# Patient Record
Sex: MALE | Race: WHITE | NOT HISPANIC OR LATINO | Employment: FULL TIME | URBAN - METROPOLITAN AREA
[De-identification: names, ages, dates, MRNs, and addresses within clinical notes are randomized per-mention and may not be internally consistent; named-entity substitution may affect disease eponyms.]

---

## 2019-03-10 ENCOUNTER — OFFICE VISIT (OUTPATIENT)
Dept: URGENT CARE | Facility: PHYSICIAN GROUP | Age: 49
End: 2019-03-10
Payer: COMMERCIAL

## 2019-03-10 VITALS
HEIGHT: 73 IN | HEART RATE: 77 BPM | DIASTOLIC BLOOD PRESSURE: 104 MMHG | BODY MASS INDEX: 22.53 KG/M2 | WEIGHT: 170 LBS | TEMPERATURE: 98.8 F | OXYGEN SATURATION: 99 % | RESPIRATION RATE: 16 BRPM | SYSTOLIC BLOOD PRESSURE: 162 MMHG

## 2019-03-10 DIAGNOSIS — K04.7 DENTAL ABSCESS: ICD-10-CM

## 2019-03-10 PROCEDURE — 99204 OFFICE O/P NEW MOD 45 MIN: CPT | Performed by: FAMILY MEDICINE

## 2019-03-10 RX ORDER — AMOXICILLIN AND CLAVULANATE POTASSIUM 875; 125 MG/1; MG/1
1 TABLET, FILM COATED ORAL 2 TIMES DAILY
Qty: 14 TAB | Refills: 0 | Status: SHIPPED | OUTPATIENT
Start: 2019-03-10 | End: 2019-03-17

## 2019-03-10 NOTE — PROGRESS NOTES
"Chief Complaint   Patient presents with   • Dental Pain     R side facial [id number]      Subjective:      Chief Complaint   Patient presents with   • Dental Pain     R side facial [id number]                Dental Pain   This is a new problem. The current episode started in the past 3 days. The problem occurs constantly (constant, throbbing).   Pain is located at rt upper 2nd molar.    The problem has been worsening. Pertinent negatives include no chills, congestion, fatigue, fever, nausea, visual change or vomiting. Chewing aggravates the symptoms. Pt has tried tylenol for the symptoms - no improvement.             Past medical history was unremarkable and not pertinent to current issue  Social hx - denies tobacco, alcohol, drug use  Family hx was reviewed - no pertinent past family hx            Review of Systems   Constitutional: Negative for fever, chills and malaise/fatigue.   HEENT: Positive for facial swelling.   Eyes: Negative for vision changes, d/c.    Respiratory: Negative for cough and sputum production.    Cardiovascular: Negative for chest pain and palpitations.   Gastrointestinal: Negative for nausea, vomiting, abdominal pain, diarrhea and constipation.   Genitourinary: Negative for dysuria, urgency and frequency.   Skin: Negative for rash or  itching.   Neurological: Negative for dizziness and tingling.   Psychiatric/Behavioral: Negative for depression.   Hematologic/lymphatic - denies bruising or excessive bleeding  All other systems reviewed and are negative.            Objective:     Blood pressure (!) 162/104, pulse 77, temperature 37.1 °C (98.8 °F), temperature source Temporal, resp. rate 16, height 1.854 m (6' 1\"), weight 77.1 kg (170 lb), SpO2 99 %.    Physical Exam   Constitutional: pt is oriented to person, place, and time. Pt appears well-developed. No distress.   HENT:   Head: Normocephalic and atraumatic.   Mouth/Throat:     Gingival swelling and tenderness around 2nd   Upper    " Right  molar  Lymph:   + cervical LAD on the right  Eyes: Conjunctivae are normal. Pupils are equal, round, and reactive to light. No scleral icterus.   Cardiovascular: Normal rate and regular rhythm.    Pulmonary/Chest: Effort normal and breath sounds normal. No respiratory distress. Pt has no wheezes.   Neurological: pt is alert and oriented to person, place, and time. No cranial nerve deficit.   Skin: Skin is warm. He is not diaphoretic. No erythema.   Psychiatric:  behavior is normal.   Nursing note and vitals reviewed.              Assessment/Plan:     1. Dental abscess     - amoxicillin-clavulanate (AUGMENTIN) 875-125 MG Tab; Take 1 Tab by mouth 2 times a day for 7 days.  Dispense: 14 Tab; Refill: 0    F/u with dentist ASAP

## 2019-06-20 DIAGNOSIS — Z01.812 PRE-OPERATIVE LABORATORY EXAMINATION: ICD-10-CM

## 2019-06-20 LAB
ALBUMIN SERPL BCP-MCNC: 4.1 G/DL (ref 3.2–4.9)
ALBUMIN/GLOB SERPL: 1.9 G/DL
ALP SERPL-CCNC: 47 U/L (ref 30–99)
ALT SERPL-CCNC: 20 U/L (ref 2–50)
ANION GAP SERPL CALC-SCNC: 5 MMOL/L (ref 0–11.9)
APPEARANCE UR: CLEAR
AST SERPL-CCNC: 18 U/L (ref 12–45)
BILIRUB SERPL-MCNC: 1.4 MG/DL (ref 0.1–1.5)
BILIRUB UR QL STRIP.AUTO: NEGATIVE
BUN SERPL-MCNC: 16 MG/DL (ref 8–22)
CALCIUM SERPL-MCNC: 8.9 MG/DL (ref 8.5–10.5)
CHLORIDE SERPL-SCNC: 106 MMOL/L (ref 96–112)
CO2 SERPL-SCNC: 26 MMOL/L (ref 20–33)
COLOR UR: YELLOW
CREAT SERPL-MCNC: 1.21 MG/DL (ref 0.5–1.4)
ERYTHROCYTE [DISTWIDTH] IN BLOOD BY AUTOMATED COUNT: 41.2 FL (ref 35.9–50)
GLOBULIN SER CALC-MCNC: 2.2 G/DL (ref 1.9–3.5)
GLUCOSE SERPL-MCNC: 69 MG/DL (ref 65–99)
GLUCOSE UR STRIP.AUTO-MCNC: NEGATIVE MG/DL
HCT VFR BLD AUTO: 42.7 % (ref 42–52)
HGB BLD-MCNC: 13.8 G/DL (ref 14–18)
KETONES UR STRIP.AUTO-MCNC: NEGATIVE MG/DL
LEUKOCYTE ESTERASE UR QL STRIP.AUTO: NEGATIVE
MCH RBC QN AUTO: 29.1 PG (ref 27–33)
MCHC RBC AUTO-ENTMCNC: 32.3 G/DL (ref 33.7–35.3)
MCV RBC AUTO: 90.1 FL (ref 81.4–97.8)
MICRO URNS: NORMAL
NITRITE UR QL STRIP.AUTO: NEGATIVE
PH UR STRIP.AUTO: 6 [PH]
PLATELET # BLD AUTO: 216 K/UL (ref 164–446)
PMV BLD AUTO: 10.4 FL (ref 9–12.9)
POTASSIUM SERPL-SCNC: 4 MMOL/L (ref 3.6–5.5)
PROT SERPL-MCNC: 6.3 G/DL (ref 6–8.2)
PROT UR QL STRIP: NEGATIVE MG/DL
RBC # BLD AUTO: 4.74 M/UL (ref 4.7–6.1)
RBC UR QL AUTO: NEGATIVE
SODIUM SERPL-SCNC: 137 MMOL/L (ref 135–145)
SP GR UR STRIP.AUTO: 1.02
UROBILINOGEN UR STRIP.AUTO-MCNC: 0.2 MG/DL
WBC # BLD AUTO: 6.5 K/UL (ref 4.8–10.8)

## 2019-06-20 PROCEDURE — 87086 URINE CULTURE/COLONY COUNT: CPT

## 2019-06-20 PROCEDURE — 81003 URINALYSIS AUTO W/O SCOPE: CPT

## 2019-06-20 PROCEDURE — 80053 COMPREHEN METABOLIC PANEL: CPT

## 2019-06-20 PROCEDURE — 36415 COLL VENOUS BLD VENIPUNCTURE: CPT

## 2019-06-20 PROCEDURE — 85027 COMPLETE CBC AUTOMATED: CPT

## 2019-06-23 LAB
BACTERIA UR CULT: NORMAL
SIGNIFICANT IND 70042: NORMAL
SITE SITE: NORMAL
SOURCE SOURCE: NORMAL

## 2019-06-27 ENCOUNTER — HOSPITAL ENCOUNTER (OUTPATIENT)
Facility: MEDICAL CENTER | Age: 49
End: 2019-06-27
Attending: UROLOGY | Admitting: UROLOGY
Payer: COMMERCIAL

## 2019-06-27 ENCOUNTER — ANESTHESIA EVENT (OUTPATIENT)
Dept: SURGERY | Facility: MEDICAL CENTER | Age: 49
End: 2019-06-27
Payer: COMMERCIAL

## 2019-06-27 ENCOUNTER — ANESTHESIA (OUTPATIENT)
Dept: SURGERY | Facility: MEDICAL CENTER | Age: 49
End: 2019-06-27
Payer: COMMERCIAL

## 2019-06-27 VITALS
OXYGEN SATURATION: 98 % | WEIGHT: 154.1 LBS | TEMPERATURE: 97.5 F | BODY MASS INDEX: 20.42 KG/M2 | RESPIRATION RATE: 16 BRPM | HEIGHT: 73 IN

## 2019-06-27 DIAGNOSIS — G89.18 POSTOPERATIVE PAIN: ICD-10-CM

## 2019-06-27 LAB — PATHOLOGY CONSULT NOTE: NORMAL

## 2019-06-27 PROCEDURE — 501838 HCHG SUTURE GENERAL: Performed by: UROLOGY

## 2019-06-27 PROCEDURE — A6403 STERILE GAUZE>16 <= 48 SQ IN: HCPCS | Performed by: UROLOGY

## 2019-06-27 PROCEDURE — 160036 HCHG PACU - EA ADDL 30 MINS PHASE I: Performed by: UROLOGY

## 2019-06-27 PROCEDURE — 88305 TISSUE EXAM BY PATHOLOGIST: CPT

## 2019-06-27 PROCEDURE — 160048 HCHG OR STATISTICAL LEVEL 1-5: Performed by: UROLOGY

## 2019-06-27 PROCEDURE — 160046 HCHG PACU - 1ST 60 MINS PHASE II: Performed by: UROLOGY

## 2019-06-27 PROCEDURE — 160002 HCHG RECOVERY MINUTES (STAT): Performed by: UROLOGY

## 2019-06-27 PROCEDURE — 700105 HCHG RX REV CODE 258: Performed by: UROLOGY

## 2019-06-27 PROCEDURE — 501445 HCHG STAPLER, SKIN DISP: Performed by: UROLOGY

## 2019-06-27 PROCEDURE — A6402 STERILE GAUZE <= 16 SQ IN: HCPCS | Performed by: UROLOGY

## 2019-06-27 PROCEDURE — 500380 HCHG DRAIN, PENROSE 1/4X12: Performed by: UROLOGY

## 2019-06-27 PROCEDURE — 160041 HCHG SURGERY MINUTES - EA ADDL 1 MIN LEVEL 4: Performed by: UROLOGY

## 2019-06-27 PROCEDURE — 160009 HCHG ANES TIME/MIN: Performed by: UROLOGY

## 2019-06-27 PROCEDURE — 700101 HCHG RX REV CODE 250: Performed by: ANESTHESIOLOGY

## 2019-06-27 PROCEDURE — 700111 HCHG RX REV CODE 636 W/ 250 OVERRIDE (IP): Performed by: ANESTHESIOLOGY

## 2019-06-27 PROCEDURE — 700111 HCHG RX REV CODE 636 W/ 250 OVERRIDE (IP): Performed by: UROLOGY

## 2019-06-27 PROCEDURE — 700102 HCHG RX REV CODE 250 W/ 637 OVERRIDE(OP): Performed by: ANESTHESIOLOGY

## 2019-06-27 PROCEDURE — 160029 HCHG SURGERY MINUTES - 1ST 30 MINS LEVEL 4: Performed by: UROLOGY

## 2019-06-27 PROCEDURE — A9270 NON-COVERED ITEM OR SERVICE: HCPCS

## 2019-06-27 PROCEDURE — 160025 RECOVERY II MINUTES (STATS): Performed by: UROLOGY

## 2019-06-27 PROCEDURE — A9270 NON-COVERED ITEM OR SERVICE: HCPCS | Performed by: ANESTHESIOLOGY

## 2019-06-27 PROCEDURE — 160035 HCHG PACU - 1ST 60 MINS PHASE I: Performed by: UROLOGY

## 2019-06-27 PROCEDURE — 700102 HCHG RX REV CODE 250 W/ 637 OVERRIDE(OP)

## 2019-06-27 PROCEDURE — 700101 HCHG RX REV CODE 250: Performed by: UROLOGY

## 2019-06-27 RX ORDER — LABETALOL HYDROCHLORIDE 5 MG/ML
5 INJECTION, SOLUTION INTRAVENOUS
Status: DISCONTINUED | OUTPATIENT
Start: 2019-06-27 | End: 2019-06-27 | Stop reason: HOSPADM

## 2019-06-27 RX ORDER — METOCLOPRAMIDE HYDROCHLORIDE 5 MG/ML
INJECTION INTRAMUSCULAR; INTRAVENOUS
Status: COMPLETED
Start: 2019-06-27 | End: 2019-06-27

## 2019-06-27 RX ORDER — DEXAMETHASONE SODIUM PHOSPHATE 4 MG/ML
INJECTION, SOLUTION INTRA-ARTICULAR; INTRALESIONAL; INTRAMUSCULAR; INTRAVENOUS; SOFT TISSUE PRN
Status: DISCONTINUED | OUTPATIENT
Start: 2019-06-27 | End: 2019-06-27 | Stop reason: SURG

## 2019-06-27 RX ORDER — DIPHENHYDRAMINE HYDROCHLORIDE 50 MG/ML
12.5 INJECTION INTRAMUSCULAR; INTRAVENOUS
Status: DISCONTINUED | OUTPATIENT
Start: 2019-06-27 | End: 2019-06-27 | Stop reason: HOSPADM

## 2019-06-27 RX ORDER — ONDANSETRON 2 MG/ML
INJECTION INTRAMUSCULAR; INTRAVENOUS PRN
Status: DISCONTINUED | OUTPATIENT
Start: 2019-06-27 | End: 2019-06-27 | Stop reason: SURG

## 2019-06-27 RX ORDER — OXYCODONE HCL 5 MG/5 ML
5 SOLUTION, ORAL ORAL
Status: COMPLETED | OUTPATIENT
Start: 2019-06-27 | End: 2019-06-27

## 2019-06-27 RX ORDER — BUPIVACAINE HYDROCHLORIDE 2.5 MG/ML
INJECTION, SOLUTION EPIDURAL; INFILTRATION; INTRACAUDAL
Status: DISCONTINUED | OUTPATIENT
Start: 2019-06-27 | End: 2019-06-27 | Stop reason: HOSPADM

## 2019-06-27 RX ORDER — OXYCODONE HCL 5 MG/5 ML
10 SOLUTION, ORAL ORAL
Status: COMPLETED | OUTPATIENT
Start: 2019-06-27 | End: 2019-06-27

## 2019-06-27 RX ORDER — MEPERIDINE HYDROCHLORIDE 25 MG/ML
12.5 INJECTION INTRAMUSCULAR; INTRAVENOUS; SUBCUTANEOUS
Status: DISCONTINUED | OUTPATIENT
Start: 2019-06-27 | End: 2019-06-27 | Stop reason: HOSPADM

## 2019-06-27 RX ORDER — SODIUM CHLORIDE, SODIUM LACTATE, POTASSIUM CHLORIDE, CALCIUM CHLORIDE 600; 310; 30; 20 MG/100ML; MG/100ML; MG/100ML; MG/100ML
INJECTION, SOLUTION INTRAVENOUS CONTINUOUS
Status: DISCONTINUED | OUTPATIENT
Start: 2019-06-27 | End: 2019-06-27 | Stop reason: HOSPADM

## 2019-06-27 RX ORDER — HYDROMORPHONE HYDROCHLORIDE 1 MG/ML
0.1 INJECTION, SOLUTION INTRAMUSCULAR; INTRAVENOUS; SUBCUTANEOUS
Status: DISCONTINUED | OUTPATIENT
Start: 2019-06-27 | End: 2019-06-27 | Stop reason: HOSPADM

## 2019-06-27 RX ORDER — HYDROMORPHONE HYDROCHLORIDE 1 MG/ML
0.2 INJECTION, SOLUTION INTRAMUSCULAR; INTRAVENOUS; SUBCUTANEOUS
Status: DISCONTINUED | OUTPATIENT
Start: 2019-06-27 | End: 2019-06-27 | Stop reason: HOSPADM

## 2019-06-27 RX ORDER — SODIUM CHLORIDE, SODIUM LACTATE, POTASSIUM CHLORIDE, AND CALCIUM CHLORIDE .6; .31; .03; .02 G/100ML; G/100ML; G/100ML; G/100ML
500 INJECTION, SOLUTION INTRAVENOUS ONCE
Status: DISCONTINUED | OUTPATIENT
Start: 2019-06-27 | End: 2019-06-27 | Stop reason: HOSPADM

## 2019-06-27 RX ORDER — MIDAZOLAM HYDROCHLORIDE 1 MG/ML
INJECTION INTRAMUSCULAR; INTRAVENOUS
Status: COMPLETED
Start: 2019-06-27 | End: 2019-06-27

## 2019-06-27 RX ORDER — CEFAZOLIN SODIUM 1 G/3ML
INJECTION, POWDER, FOR SOLUTION INTRAMUSCULAR; INTRAVENOUS PRN
Status: DISCONTINUED | OUTPATIENT
Start: 2019-06-27 | End: 2019-06-27 | Stop reason: SURG

## 2019-06-27 RX ORDER — ACETAMINOPHEN 500 MG
TABLET ORAL
Status: COMPLETED
Start: 2019-06-27 | End: 2019-06-27

## 2019-06-27 RX ORDER — COLLODION, FLEXIBLE
LIQUID (ML) MISCELLANEOUS
Status: DISCONTINUED | OUTPATIENT
Start: 2019-06-27 | End: 2019-06-27 | Stop reason: HOSPADM

## 2019-06-27 RX ORDER — METOCLOPRAMIDE HYDROCHLORIDE 5 MG/ML
INJECTION INTRAMUSCULAR; INTRAVENOUS PRN
Status: DISCONTINUED | OUTPATIENT
Start: 2019-06-27 | End: 2019-06-27 | Stop reason: SURG

## 2019-06-27 RX ORDER — ACETAMINOPHEN 500 MG
1000 TABLET ORAL ONCE
Status: DISCONTINUED | OUTPATIENT
Start: 2019-06-27 | End: 2019-06-27 | Stop reason: HOSPADM

## 2019-06-27 RX ORDER — ONDANSETRON 2 MG/ML
4 INJECTION INTRAMUSCULAR; INTRAVENOUS
Status: DISCONTINUED | OUTPATIENT
Start: 2019-06-27 | End: 2019-06-27 | Stop reason: HOSPADM

## 2019-06-27 RX ORDER — HYDROCODONE BITARTRATE AND ACETAMINOPHEN 5; 325 MG/1; MG/1
1 TABLET ORAL EVERY 4 HOURS PRN
Qty: 20 TAB | Refills: 0 | Status: SHIPPED | OUTPATIENT
Start: 2019-06-27 | End: 2019-07-04

## 2019-06-27 RX ORDER — HYDROMORPHONE HYDROCHLORIDE 1 MG/ML
0.4 INJECTION, SOLUTION INTRAMUSCULAR; INTRAVENOUS; SUBCUTANEOUS
Status: DISCONTINUED | OUTPATIENT
Start: 2019-06-27 | End: 2019-06-27 | Stop reason: HOSPADM

## 2019-06-27 RX ORDER — CELECOXIB 200 MG/1
CAPSULE ORAL
Status: COMPLETED
Start: 2019-06-27 | End: 2019-06-27

## 2019-06-27 RX ORDER — CELECOXIB 200 MG/1
200 CAPSULE ORAL ONCE
Status: DISCONTINUED | OUTPATIENT
Start: 2019-06-27 | End: 2019-06-27 | Stop reason: HOSPADM

## 2019-06-27 RX ORDER — HYDRALAZINE HYDROCHLORIDE 20 MG/ML
5 INJECTION INTRAMUSCULAR; INTRAVENOUS
Status: DISCONTINUED | OUTPATIENT
Start: 2019-06-27 | End: 2019-06-27 | Stop reason: HOSPADM

## 2019-06-27 RX ADMIN — MIDAZOLAM HYDROCHLORIDE 2 MG: 1 INJECTION, SOLUTION INTRAMUSCULAR; INTRAVENOUS at 07:32

## 2019-06-27 RX ADMIN — FENTANYL CITRATE 100 MCG: 50 INJECTION, SOLUTION INTRAMUSCULAR; INTRAVENOUS at 07:37

## 2019-06-27 RX ADMIN — LIDOCAINE HYDROCHLORIDE 0.5 ML: 10 INJECTION, SOLUTION INFILTRATION; PERINEURAL at 06:24

## 2019-06-27 RX ADMIN — ONDANSETRON 4 MG: 2 INJECTION INTRAMUSCULAR; INTRAVENOUS at 07:50

## 2019-06-27 RX ADMIN — LIDOCAINE HYDROCHLORIDE 100 MG: 20 INJECTION, SOLUTION INFILTRATION; PERINEURAL at 07:37

## 2019-06-27 RX ADMIN — ACETAMINOPHEN 1000 MG: 500 TABLET, FILM COATED ORAL at 07:12

## 2019-06-27 RX ADMIN — CELECOXIB 200 MG: 200 CAPSULE ORAL at 07:12

## 2019-06-27 RX ADMIN — CEFAZOLIN 2 G: 1 INJECTION, POWDER, FOR SOLUTION INTRAVENOUS at 07:32

## 2019-06-27 RX ADMIN — OXYCODONE HYDROCHLORIDE 5 MG: 5 SOLUTION ORAL at 11:41

## 2019-06-27 RX ADMIN — SODIUM CHLORIDE, POTASSIUM CHLORIDE, SODIUM LACTATE AND CALCIUM CHLORIDE: 600; 310; 30; 20 INJECTION, SOLUTION INTRAVENOUS at 09:00

## 2019-06-27 RX ADMIN — METOCLOPRAMIDE 10 MG: 5 INJECTION, SOLUTION INTRAMUSCULAR; INTRAVENOUS at 07:32

## 2019-06-27 RX ADMIN — SODIUM CHLORIDE, POTASSIUM CHLORIDE, SODIUM LACTATE AND CALCIUM CHLORIDE: 600; 310; 30; 20 INJECTION, SOLUTION INTRAVENOUS at 09:20

## 2019-06-27 RX ADMIN — SODIUM CHLORIDE, POTASSIUM CHLORIDE, SODIUM LACTATE AND CALCIUM CHLORIDE: 600; 310; 30; 20 INJECTION, SOLUTION INTRAVENOUS at 06:24

## 2019-06-27 RX ADMIN — SODIUM CHLORIDE, POTASSIUM CHLORIDE, SODIUM LACTATE AND CALCIUM CHLORIDE: 600; 310; 30; 20 INJECTION, SOLUTION INTRAVENOUS at 07:32

## 2019-06-27 RX ADMIN — FENTANYL CITRATE 100 MCG: 50 INJECTION, SOLUTION INTRAMUSCULAR; INTRAVENOUS at 08:47

## 2019-06-27 RX ADMIN — DEXAMETHASONE SODIUM PHOSPHATE 10 MG: 4 INJECTION, SOLUTION INTRAMUSCULAR; INTRAVENOUS at 07:50

## 2019-06-27 RX ADMIN — PROPOFOL 200 MG: 10 INJECTION, EMULSION INTRAVENOUS at 07:37

## 2019-06-27 ASSESSMENT — PAIN SCALES - GENERAL: PAIN_LEVEL: 1

## 2019-06-27 NOTE — OR NURSING
1031: Pt arrived post R epididymectomy, OPA in use/respirations unlabored/sats approp with NC, Anesthesia report/OR RN hand off, IVF infusing, Dressing to R groin-steri strips/benzoin/tegaderm and fluff/scrotal support in place-scrotal incision covered with liquid adhesive, Ice pack placed to R groin area  1038: No changes  1054: OPA removed, NC oxygen

## 2019-06-27 NOTE — OP REPORT
DATE OF SERVICE:  06/27/2019    PREOPERATIVE DIAGNOSES:  History of right scrotal trauma with retraction of   testicle and chronic right testicular pain, chronic right epididymitis.    POSTOPERATIVE DIAGNOSES:  History of right scrotal trauma with retraction of   testicle and chronic right testicular pain, chronic right epididymitis, severe   scarring of right distal inguinal canal.    PROCEDURE:  Right inguinal canal exploration with lysis of marked scarring   along right cord with right epididymectomy and right orchiopexy.    ANESTHESIA:  General.    ANESTHESIOLOGIST:  Shakeel Pena MD    SURGEON:  Drake Gaines MD    BRIEF HISTORY:  This 49-year-old male who suffered testicular trauma several   years ago.  After that, he had fairly severe hematoma, but subsequent to that,   his right testicle then retracted up into the lower inguinal canal and he has   had chronic testicular discomfort ever since.  Also ultrasound did show what   was suspected to be enlarged epididymis, but a normal testicle.  Also, at this   point on exam, the testicle was palpable right at the external ring and is   not movable.  At this point, he now presents for exploration of the inguinal   canal and scrotum with lysis of adhesions and an attempt at a right   epididymectomy and probably orchiopexy.    REPORT OF OPERATION:  Under general anesthesia with the patient supine on the   operating room table, the scrotum and lower abdomen were shaved, prepped and   draped in a sterile manner.  A right inguinal incision was made and dissection   carried down to the external oblique aponeurosis, which was opened up along   its longitudinal fibers.  Care was taken to protect the ilioinguinal nerve,   which was then dissected off the cord.  This was then passed out of the field   under a hemostat on fascial edge.  Next, I was able to then push the testicle   up into the field, but it was quite fixed.  There also did appear to be a mass    in the cord probably it did appear to be a lipoma of the cord.  At this   point, I then worked to free the testicle and cord up circumferentially.  The   testicle did seem to be fairly fixed into the scrotal tissue.  I was able to   gradually dissect this off using both sharp, blunt and electrocautery   dissection.  This did take some time.  Once the testicle was then freed up, I   then was able to dissect posterior and lateral to the testicle.  Care was   taken to really avoid damaging any of the vital structures in the cord.  Once   I was able to free the testicle up, I was able to free it up to close to the   internal ring, but there was really marked hard scarring on the distal cord on   the area of the testicle.  I was not able to really identify the tunica   vaginalis because of severe thickening of this area and scarring.  At this   point what I thought was the epididymis, I then gently dissected down through   this scar tissue.  Eventually, I did find a plane and was able to dissect onto   the testicle.  This did open up the tunica vaginalis which was able to be   opened up along its longitudinal fibers.  No abnormalities of the testicle   were noted.  I then found the epididymis which was fairly normal in   appearance.  At this point, I was able to identify the structures, but really   on the distal cord, it was severely scarred.  I could feel the vas deferens up   above the scarring.  The scarring did extend for approximately 2 cm down on   to the testicle.  At this point, I began to dissect the epididymis off of the   testicle staying close to the epididymis.  Points of bleeding were cauterized   with the electrocautery.  I was able to then dissect up to approximately the   tail of the epididymis, but this did require quite a bit of gentle teasing and   dissection to free this from the overlying scar tissue.  At this point, when   I felt that that the tail of the epididymis going into the vas, I elected  to   just excise the epididymis at this point.  I was afraid further dissection of   the vas through the scar would lead to possible injury to the blood supply to   the testicle.  The testicle did appear viable throughout the whole case   though. At this point, I was able to free up several other adhesions of the   cord near the internal ring.  I was then able to get the testicle down into   probably a mid scrotal position.  At this point, I then created a subdartos   pouch.  At this point, the underlying fascia was incised transversely.  A   stitch of 3-0 Vicryl was then placed at each corner and then I was able to   bring the testicle down through the tract into this pouch.  It was then   anchored bilaterally.  The testicle was then placed under the skin in the   scrotum and I put a third stitch anchor in the inferior aspect of the testicle   to the subcutaneous tissue.  At this point, 3-4 mL of 0.25% plain Marcaine   was then instilled into the scrotal skin.  The skin was then closed with   interrupted stitch of 2-0 Vicryl.  At this point, Collodion was applied to the   incision later in the case.  Next, about 5 mL were instilled into the cord   itself.  The skin was anesthetized at the beginning of the case and then at   this point also.  The external oblique aponeurosis was then reapproximated   with a running stitch of 2-0 Vicryl with care taken to preserve the   ilioinguinal nerve.  The subcutaneous tissue was reapproximated with a running   stitch of 2-0 Vicryl.  Skin was closed with a running subcuticular stitch of   2-0 Monocryl.  Once this was done, more Marcaine was then instilled into the   incision.  Benzoin, Steri-Strips, and Tegaderm were applied to this inguinal   incision.  The testicle was in the mid portion of the scrotum.  At this point,   the patient was totally cleaned up.  Fluffs and a scrotal support were placed   on the patient.  He was then woken up, and transferred to the PACU in stable    condition.     TIME OF OPERATION:  Approximately 2-1/2 hours.       ____________________________________     MD PHYLLIS CAO / JOSE A    DD:  06/27/2019 10:43:35  DT:  06/27/2019 12:33:14    D#:  [id number]  Job#:  [id number]

## 2019-06-27 NOTE — OR NURSING
1112: Pt groggy but awakens, Small sips of water, Weaning oxygen as tolerated  1140: Pt starting to have some p[ain-rates very low/see MAR, Denies nausea, RA paulo approp, Dressing to R groin intact-scant drainage noted, Pt will transfer soon to [facility]

## 2019-06-27 NOTE — DISCHARGE INSTRUCTIONS
ACTIVITY: Rest and take it easy for the first 24 hours.  A responsible adult is recommended to remain with you during that time.  It is normal to feel sleepy.  We encourage you to not do anything that requires balance, judgment or coordination.    MILD FLU-LIKE SYMPTOMS ARE NORMAL. YOU MAY EXPERIENCE GENERALIZED MUSCLE ACHES, THROAT IRRITATION, HEADACHE AND/OR SOME NAUSEA.    FOR 24 HOURS DO NOT:  Drive, operate machinery or run household appliances.  Drink beer or alcoholic beverages.   Make important decisions or sign legal documents.    SPECIAL INSTRUCTIONS:   Keep dressing clean, dry and intact; may shower after 24 hours. Wear scrotal support day and night x 1 week    DIET: To avoid nausea, slowly advance diet as tolerated, avoiding spicy or greasy foods for the first day.  Add more substantial food to your diet according to your physician's instructions.  INCREASE FLUIDS AND FIBER TO AVOID CONSTIPATION.    FOLLOW-UP APPOINTMENT:  A follow-up appointment should be arranged with your doctor; call to schedule.    You should CALL YOUR PHYSICIAN if you develop:  Fever greater than 101 degrees F.  Pain not relieved by medication, or persistent nausea or vomiting.  Excessive bleeding (blood soaking through dressing) or unexpected drainage from the wound.  Extreme redness or swelling around the incision site, drainage of pus or foul smelling drainage.  Inability to urinate or empty your bladder within 8 hours.  Problems with breathing or chest pain.    You should call 911 if you develop problems with breathing or chest pain.  If you are unable to contact your doctor or surgical center, you should go to the nearest emergency room or urgent care center.  Dr Gaines's telephone #: [phone]    If any questions arise, call your doctor.  If your doctor is not available, please feel free to call the Surgical Center at [phone].  The Center is open Monday through Friday from 7AM to 7PM.  You can also call the  HEALTH HOTLINE open 24 hours/day, 7 days/week and speak to a nurse at [phone], or toll free at [phone].    A registered nurse may call you a few days after your surgery to see how you are doing after your procedure.    MEDICATIONS: Resume taking daily medication.  Take prescribed pain medication with food.  If no medication is prescribed, you may take non-aspirin pain medication if needed.  PAIN MEDICATION CAN BE VERY CONSTIPATING.  Take a stool softener or laxative such as senokot, pericolace, or milk of magnesia if needed.    Prescription given for Norco.  Last pain medication given at               .    If your physician has prescribed pain medication that includes Acetaminophen (Tylenol), do not take additional Acetaminophen (Tylenol) while taking the prescribed medication.    Depression / Suicide Risk    As you are discharged from this [facility] facility, it is important to learn how to keep safe from harming yourself.    Recognize the warning signs:  · Abrupt changes in personality, positive or negative- including increase in energy   · Giving away possessions  · Change in eating patterns- significant weight changes-  positive or negative  · Change in sleeping patterns- unable to sleep or sleeping all the time   · Unwillingness or inability to communicate  · Depression  · Unusual sadness, discouragement and loneliness  · Talk of wanting to die  · Neglect of personal appearance   · Rebelliousness- reckless behavior  · Withdrawal from people/activities they love  · Confusion- inability to concentrate     If you or a loved one observes any of these behaviors or has concerns about self-harm, here's what you can do:  · Talk about it- your feelings and reasons for harming yourself  · Remove any means that you might use to hurt yourself (examples: pills, rope, extension cords, firearm)  · Get professional help from the community (Mental Health, Substance Abuse, psychological counseling)  · Do not be  alone:Call your Safe Contact- someone whom you trust who will be there for you.  · Call your local CRISIS HOTLINE 680-0901 or 225-551-5137  · Call your local Children's Mobile Crisis Response Team [place] (013) 132-5042 or www.Hangtime  · Call the toll free National Suicide Prevention Hotlines   · National Suicide Prevention Lifeline 985-477-RCJS (4989)  · [facility] Network 800-SUICIDE (635-0176)

## 2019-06-27 NOTE — OR SURGEON
Immediate Post OP Note    PreOp Diagnosis: History of right scrotal trauma; chronic right testicular pain; Entrapment of testicle in right; chroni right epididymitis inguinal canal    PostOp Diagnosis: Same;  Marked scarring of right cord with     Procedure(s):  EPIDIDYMECTOMY - VIA INGUINAL INCISION - Wound Class: Clean    Surgeon(s):  Drake Gaines M.D.    Anesthesiologist/Type of Anesthesia:  Anesthesiologist: Shakeel Pena M.D./General    Surgical Staff:  Circulator: Muna Arana R.N.  Relief Circulator: Austen Wong R.N.  Relief Scrub: Cornel Nguyễn R.N.  Scrub Person: True Craft    Specimens removed if any:  ID Type Source Tests Collected by Time Destination   A : right epididymis Tissue Testis PATHOLOGY SPECIMEN Drake Gaines M.D. 6/27/2019  9:45 AM        Estimated Blood Loss: 60 cc    Findings:  As above    Complications:  none        6/27/2019 10:32 AM Drake Gaines M.D.

## 2019-06-27 NOTE — ANESTHESIA TIME REPORT
Anesthesia Start and Stop Event Times     Date Time Event    6/27/2019 0732 Anesthesia Start     1033 Anesthesia Stop        Responsible Staff  06/27/19    Name Role Begin End    Shakeel Pena M.D. 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

## 2019-06-27 NOTE — ANESTHESIA PROCEDURE NOTES
Airway  Date/Time: 6/27/2019 7:38 AM  Performed by: LOW CHAPARRO  Authorized by: LOW CHAPARRO     Location:  OR  Urgency:  Elective  Indications for Airway Management:  Anesthesia  Spontaneous Ventilation: absent    Sedation Level:  Deep  Preoxygenated: Yes    Patient Position:  Sniffing  Mask Difficulty Assessment:  1 - vent by mask  Final Airway Type:  Supraglottic airway  Final Supraglottic Airway:  Standard LMA  SGA Size:  5  Number of Attempts at Approach:  1

## 2019-06-27 NOTE — ANESTHESIA POSTPROCEDURE EVALUATION
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 Responsible Provider:  Shakeel Pena M.D.    Anesthesia Type:  general ASA Status:  1          Final Anesthesia Type: general  Last vitals  BP   NIBP: 148/85    Temp   37.1 °C (98.8 °F)    Pulse   Heart Rate (Monitored): 86   Resp   16    SpO2   100 %      Anesthesia Post Evaluation    Patient location during evaluation: PACU  Patient participation: complete - patient participated  Level of consciousness: sleepy but conscious  Pain score: 1    Airway patency: patent  Anesthetic complications: no  Cardiovascular status: hemodynamically stable  Respiratory status: acceptable  Hydration status: euvolemic    PONV: none           Nurse Pain Score: 4 (NPRS)

## 2019-09-03 ENCOUNTER — OFFICE VISIT (OUTPATIENT)
Dept: URGENT CARE | Facility: PHYSICIAN GROUP | Age: 49
End: 2019-09-03
Payer: COMMERCIAL

## 2019-09-03 VITALS
TEMPERATURE: 98.9 F | BODY MASS INDEX: 22.53 KG/M2 | HEART RATE: 90 BPM | SYSTOLIC BLOOD PRESSURE: 142 MMHG | DIASTOLIC BLOOD PRESSURE: 108 MMHG | WEIGHT: 170 LBS | HEIGHT: 73 IN | OXYGEN SATURATION: 99 %

## 2019-09-03 DIAGNOSIS — R22.31 MASS OF RIGHT AXILLA: ICD-10-CM

## 2019-09-03 PROCEDURE — 99213 OFFICE O/P EST LOW 20 MIN: CPT | Performed by: FAMILY MEDICINE

## 2019-09-04 NOTE — PROGRESS NOTES
"Subjective:      Chief Complaint   Patient presents with   • Nodule                     nodule   He noticed a small, tender nodule in rt armpit yesterday.  Pain is constant, mild, \"soreness\", that is tender to touch.   There is no rash or redness.           The problem is unchanged.     Pt was exposed to nothing. Pertinent negatives include no cough, fatigue, fever, shortness of breath or sore throat. Past treatments include nothing.     Past Medical History:   Diagnosis Date   • Dental disorder 06/2019    Partial uppers   • Pain 06/2019    Right testicle         Social History     Tobacco Use   • Smoking status: Never Smoker   • Smokeless tobacco: Never Used   Substance Use Topics   • Alcohol use: Yes     Comment: 3 per week   • Drug use: Yes     Comment: marijuana, inhales twice a day             Review of Systems   Constitutional: Negative for fever and fatigue.   HENT: Negative for sore throat.    Respiratory: Negative for cough and shortness of breath.    Skin: Positive for nodule.   All other systems reviewed and are negative.         Objective:   /108   Pulse 90   Temp 37.2 °C (98.9 °F) (Temporal)   Ht 1.854 m (6' 1\")   Wt 77.1 kg (170 lb)   SpO2 99%       Physical Exam   Constitutional: pt is oriented to person, place, and time. Pt appears well-developed and well-nourished. No distress.   HENT:   Head: Normocephalic and atraumatic.   Mouth/Throat: Oropharynx is clear and moist and mucous membranes are normal. No uvula swelling. No oropharyngeal exudate, posterior oropharyngeal edema or posterior oropharyngeal erythema.   Eyes: Conjunctivae are normal.   Cardiovascular: Normal rate, regular rhythm and normal heart sounds.    Pulmonary/Chest: Effort normal and breath sounds normal. No respiratory distress. She has no wheezes.   Neurological: pt is alert and oriented to person, place, and time. No cranial nerve deficit.   Skin: 3cm soft mobile mass in rt axilla.   No redness or skin changes. "     Lymph - there is no supraclavicular or cervical LAD    Pt is not diaphoretic.    Psychiatric: pt's behavior is normal.   Nursing note and vitals reviewed.              Assessment/Plan:           1. Mass of right axilla   likely reactive lymph node.  No obvious infection at this time.     U/s ordered for further characterization.

## 2020-07-12 ENCOUNTER — OFFICE VISIT (OUTPATIENT)
Dept: URGENT CARE | Facility: PHYSICIAN GROUP | Age: 50
End: 2020-07-12
Payer: COMMERCIAL

## 2020-07-12 VITALS
HEART RATE: 84 BPM | TEMPERATURE: 97.5 F | HEIGHT: 73 IN | RESPIRATION RATE: 16 BRPM | SYSTOLIC BLOOD PRESSURE: 128 MMHG | BODY MASS INDEX: 22.53 KG/M2 | WEIGHT: 170 LBS | OXYGEN SATURATION: 99 % | DIASTOLIC BLOOD PRESSURE: 80 MMHG

## 2020-07-12 DIAGNOSIS — T15.02XA CORNEAL FOREIGN BODY, LEFT, INITIAL ENCOUNTER: ICD-10-CM

## 2020-07-12 PROCEDURE — 99214 OFFICE O/P EST MOD 30 MIN: CPT | Performed by: NURSE PRACTITIONER

## 2020-07-12 RX ORDER — TOBRAMYCIN AND DEXAMETHASONE 3; 1 MG/ML; MG/ML
1 SUSPENSION/ DROPS OPHTHALMIC
Qty: 10 ML | Refills: 0 | Status: SHIPPED | OUTPATIENT
Start: 2020-07-12

## 2020-07-12 ASSESSMENT — ENCOUNTER SYMPTOMS
EYE PAIN: 1
BLURRED VISION: 0
CHILLS: 0
HEADACHES: 0
EYE REDNESS: 1
FEVER: 0
DOUBLE VISION: 0
PHOTOPHOBIA: 1
EYE DISCHARGE: 1
DIZZINESS: 0

## 2020-07-12 ASSESSMENT — VISUAL ACUITY: OU: 1

## 2020-07-12 ASSESSMENT — FIBROSIS 4 INDEX: FIB4 SCORE: 0.89

## 2020-07-12 NOTE — PROGRESS NOTES
Subjective:   Neville Peace  is a 50 y.o. male who presents for Foreign Body in Eye (last night was itchy and felt like something got in there )        50-year-old male patient reports urgent care for new problem that started last night. Patient states he was driving and felt pain in his left eye.  Patient denies any mechanism of injury or moment where he felt a foreign body but felt all of a sudden like there was something in his eye.  Patient states that he has massive tearing but no changes in vision.  Patient states that he is having hard time with bright lights and sunlight.  Denies any dizziness, nausea, vomiting or headache.  Patient has not taken anything over-the-counter for symptoms.    Review of Systems   Constitutional: Negative for chills, fever and malaise/fatigue.   Eyes: Positive for photophobia, pain, discharge and redness. Negative for blurred vision and double vision.   Neurological: Negative for dizziness and headaches.     Past Medical History:   Diagnosis Date   • Dental disorder 06/2019    Partial uppers   • Pain 06/2019    Right testicle      Past Surgical History:   Procedure Laterality Date   • PB EXPLORE SCROTUM N/A 6/27/2019    Procedure: EXPLORATION, SCROTUM;  Surgeon: Drake Gaines M.D.;  Location: [facility];  Service: Urology   • EPIDIDYMECTOMY Right 6/27/2019    Procedure: EPIDIDYMECTOMY - VIA INGUINAL INCISION;  Surgeon: Drake Gaines M.D.;  Location: [facility];  Service: Urology   • ORCHIOPEXY ADULT N/A 6/27/2019    Procedure: ORCHIOPEXY, ADULT;  Surgeon: Drake Gaines M.D.;  Location: [facility];  Service: Urology   • LYSIS ADHESIONS GENERAL N/A 6/27/2019    Procedure: LYSIS, ADHESIONS;  Surgeon: Drake Gaines M.D.;  Location: [facility];  Service: Urology   • DENTAL SURGERY  1996    [place] teeth       Social History     Socioeconomic History   • Marital status:       "Spouse name: Not on file   • Number of children: Not on file   • Years of education: Not on file   • Highest education level: Not on file   Occupational History   • Not on file   Social Needs   • Financial resource strain: Not on file   • Food insecurity     Worry: Not on file     Inability: Not on file   • Transportation needs     Medical: Not on file     Non-medical: Not on file   Tobacco Use   • Smoking status: Never Smoker   • Smokeless tobacco: Never Used   Substance and Sexual Activity   • Alcohol use: Yes     Comment: 3 per week   • Drug use: Yes     Comment: marijuana, inhales twice a day   • Sexual activity: Not on file   Lifestyle   • Physical activity     Days per week: Not on file     Minutes per session: Not on file   • Stress: Not on file   Relationships   • Social connections     Talks on phone: Not on file     Gets together: Not on file     Attends [employer] service: Not on file     Active member of club or organization: Not on file     Attends meetings of clubs or organizations: Not on file     Relationship status: Not on file   • Intimate partner violence     Fear of current or ex partner: Not on file     Emotionally abused: Not on file     Physically abused: Not on file     Forced sexual activity: Not on file   Other Topics Concern   • Not on file   Social History Narrative   • Not on file    Patient has no known allergies.       Objective:   /80 (BP Location: Right arm, Patient Position: Sitting, BP Cuff Size: Adult)   Pulse 84   Temp 36.4 °C (97.5 °F) (Tympanic)   Resp 16   Ht 1.854 m (6' 1\")   Wt 77.1 kg (170 lb)   SpO2 99%   BMI 22.43 kg/m²   Physical Exam  Vitals signs reviewed.   Constitutional:       Appearance: Normal appearance.   Eyes:      General: Lids are normal. Lids are everted, no foreign bodies appreciated. Vision grossly intact. Gaze aligned appropriately.      Extraocular Movements: Extraocular movements intact.      Conjunctiva/sclera:      Left eye: Left " conjunctiva is injected.      Pupils: Pupils are equal, round, and reactive to light.      Visual Fields: Right eye visual fields normal and left eye visual fields normal.      Comments: OD - 20/40  OU - 20/40  OS - 20/40    Fluorescein eye examination revealed foreign body at the 9 o'clock position just lateral to patient's pupil.  Unable to be moved with moistened Q-tip.   Cardiovascular:      Rate and Rhythm: Normal rate and regular rhythm.      Heart sounds: Normal heart sounds.   Pulmonary:      Effort: Pulmonary effort is normal.      Breath sounds: Normal breath sounds.   Skin:     General: Skin is warm.   Neurological:      Mental Status: He is alert and oriented to person, place, and time.   Psychiatric:         Mood and Affect: Mood normal.         Behavior: Behavior normal.         Thought Content: Thought content normal.         Judgment: Judgment normal.           Assessment/Plan:      1. Corneal foreign body, left, initial encounter  - tobramycin-dexamethasone (TOBRADEX) 0.3-0.1 % Suspension; Place 1 Drop in both eyes every 4 hours while awake.  Dispense: 10 mL; Refill: 0    Discussed physical examination findings are concerning for a foreign body and possible corneal ulceration or abrasion.  Due to the inability to remove the foreign body, provided patient with contact information for [facility] with a contact number.  Advised patient to call and make an appointment tomorrow.  Additionally prescribe patient TobraDex to use 1 drop every 4 hours and provided strict ER precautions for worsening of symptoms including pain and changes in vision.    Supportive care, differential diagnoses, and indications for immediate follow-up discussed with patient.    Pathogenesis of diagnosis discussed including typical length and natural progression. Patient expresses understanding and agrees to plan.    Instructed patient to return to clinic for worsening symptoms or symptoms that persist for 7 to 10  days     Please note that this dictation was created using voice recognition software. I have made every reasonable attempt to correct obvious errors, but I expect that there are errors of grammar and possibly content that I did not discover before finalizing the note.

## (undated) DEVICE — SPONGE PEANUT - (5/PK 50PK/CA)

## (undated) DEVICE — DRAPE LAPAROTOMY T SHEET - (12EA/CA)

## (undated) DEVICE — MASK ANESTHESIA ADULT  - (100/CA)

## (undated) DEVICE — SUTURE 3-0 VICRYL PLUS SH - 27 INCH (36/BX)

## (undated) DEVICE — DRAIN PENROSE STERILE 1/4 X - 18 IN  (25EA/BX)

## (undated) DEVICE — BLADE SURGICAL #15 - (50/BX 3BX/CA)

## (undated) DEVICE — STAPLER SKIN DISP - (6/BX 10BX/CA) VISISTAT

## (undated) DEVICE — GOWN SURGICAL X-LARGE ULTRA - FILM-REINFORCED (20/CA)

## (undated) DEVICE — PACK MINOR BASIN - (2EA/CA)

## (undated) DEVICE — SODIUM CHL IRRIGATION 0.9% 1000ML (12EA/CA)

## (undated) DEVICE — NEPTUNE 4 PORT MANIFOLD - (20/PK)

## (undated) DEVICE — SENSOR SPO2 NEO LNCS ADHESIVE (20/BX) SEE USER NOTES

## (undated) DEVICE — ELECTRODE NEEDLE NON-SAFETY 2.8 IN (150EA/CT)

## (undated) DEVICE — CLIP SM INTNL HRZN TI ESCP LGT - (24EA/PK 25PK/BX)

## (undated) DEVICE — PROTECTOR ULNA NERVE - (36PR/CA)

## (undated) DEVICE — SET EXTENSION WITH 2 PORTS (48EA/CA) ***PART #2C8610 IS A SUBSTITUTE*****

## (undated) DEVICE — GOWN WARMING STANDARD FLEX - (30/CA)

## (undated) DEVICE — SUTURE GENERAL

## (undated) DEVICE — GAUZE FLUFF STERILE 2-PLY 36 X 36 (100EA/CA)

## (undated) DEVICE — SUCTION INSTRUMENT YANKAUER BULBOUS TIP W/O VENT (50EA/CA)

## (undated) DEVICE — CHLORAPREP 26 ML APPLICATOR - ORANGE TINT(25/CA)

## (undated) DEVICE — SLEEVE, VASO, THIGH, MED

## (undated) DEVICE — GLOVE BIOGEL SZ 7.5 SURGICAL PF LTX - (50PR/BX 4BX/CA)

## (undated) DEVICE — DRESSING TRANSPARENT FILM TEGADERM 4 X 4.75" (50EA/BX)"

## (undated) DEVICE — GLOVE BIOGEL PI ULTRATOUCH SZ 7.5 SURGICAL PF LF -(50/BX 4BX/CA)

## (undated) DEVICE — TUBING CLEARLINK DUO-VENT - C-FLO (48EA/CA)

## (undated) DEVICE — SUTURE 3-0 MONOCRYL PLUS PS-1 - 27 INCH (36/BX)

## (undated) DEVICE — HEAD HOLDER JUNIOR/ADULT

## (undated) DEVICE — ELECTRODE DUAL RETURN W/ CORD - (50/PK)

## (undated) DEVICE — KIT ANESTHESIA W/CIRCUIT & 3/LT BAG W/FILTER (20EA/CA)

## (undated) DEVICE — GLOVE BIOGEL PI ULTRATOUCH SZ 8.5 SURGICAL PF LF - (200PR/CA)

## (undated) DEVICE — ELECTRODE 850 FOAM ADHESIVE - HYDROGEL RADIOTRNSPRNT (50/PK)

## (undated) DEVICE — CANISTER SUCTION 3000ML MECHANICAL FILTER AUTO SHUTOFF MEDI-VAC NONSTERILE LF DISP  (40EA/CA)

## (undated) DEVICE — SPONGE GAUZESTER 4 X 4 4PLY - (128PK/CA)

## (undated) DEVICE — KIT ROOM DECONTAMINATION

## (undated) DEVICE — SET LEADWIRE 5 LEAD BEDSIDE DISPOSABLE ECG (1SET OF 5/EA)

## (undated) DEVICE — GLOVE SURGICAL PROTEXIS PI 8.0 LF - (50PR/BX)

## (undated) DEVICE — MASK AIRWAY SIZE 4 UNIQUE SILICON (10EA/BX)

## (undated) DEVICE — LACTATED RINGERS INJ 1000 ML - (14EA/CA 60CA/PF)

## (undated) DEVICE — SWABSTICK BENZOIN SINGLE (50EA/BX)

## (undated) DEVICE — SUTURE 2-0 VICRYL PLUS SH - 27 INCH (36/BX)

## (undated) DEVICE — CLOSURE SKIN STRIP 1/2 X 4 IN - (STERI STRIP) (50/BX 4BX/CA)